# Patient Record
Sex: FEMALE | Race: BLACK OR AFRICAN AMERICAN
[De-identification: names, ages, dates, MRNs, and addresses within clinical notes are randomized per-mention and may not be internally consistent; named-entity substitution may affect disease eponyms.]

---

## 2020-03-19 ENCOUNTER — HOSPITAL ENCOUNTER (OUTPATIENT)
Dept: HOSPITAL 62 - RAD | Age: 55
End: 2020-03-19
Payer: COMMERCIAL

## 2020-03-19 DIAGNOSIS — M25.552: ICD-10-CM

## 2020-03-19 DIAGNOSIS — M54.5: ICD-10-CM

## 2020-03-19 DIAGNOSIS — M53.3: Primary | ICD-10-CM

## 2020-03-19 PROCEDURE — 73522 X-RAY EXAM HIPS BI 3-4 VIEWS: CPT

## 2020-03-19 PROCEDURE — 72110 X-RAY EXAM L-2 SPINE 4/>VWS: CPT

## 2020-03-19 NOTE — RADIOLOGY REPORT (SQ)
EXAM DESCRIPTION:  HIP BILATERAL



COMPLETED DATE/TIME:  3/19/2020 9:16 am



REASON FOR STUDY:  (M25.552)PAIN IN LEFT HIP;(M53.3)SACROCOCCYGEAL DISORDERS, NOT ELSEWHERE CL M25.55
2  PAIN IN LEFT HIP M53.3  SACROCOCCYGEAL DISORDERS, NOT ELSEWHERE CLASSIFIED M54.5  LOW BACK PAIN



COMPARISON:  None.



NUMBER OF VIEWS:  Two views



TECHNIQUE:  AP pelvis and additional frog-leg view of both hips.



LIMITATIONS:  None.



FINDINGS:  MINERALIZATION: Normal.

HIPS: No acute fracture or dislocation. No worrisome bone lesions.

PELVIS AND SACRUM:  No acute fracture or dislocation. No worrisome bone lesions.

PUBIS AND ISCHIUM: No acute fracture.

LOWER LUMBAR SPINE: No significant findings as visualized.

SOFT TISSUES: No findings.

OTHER: No other significant finding.



IMPRESSION:  NEGATIVE STUDY OF THE PELVIS AND HIPS.



TECHNICAL DOCUMENTATION:  JOB ID:  [id number]

 2011 [employer]- All Rights Reserved



Reading location - IP/workstation name: [email or web address]

## 2020-03-19 NOTE — RADIOLOGY REPORT (SQ)
EXAM DESCRIPTION:  L SPINE WHOLE



COMPLETED DATE/TIME:  3/19/2020 9:15 am



REASON FOR STUDY:  (M25.552)PAIN IN LEFT HIP;(M53.3)SACROCOCCYGEAL DISORDERS, NOT ELSEWHERE CL M25.55
2  PAIN IN LEFT HIP M53.3  SACROCOCCYGEAL DISORDERS, NOT ELSEWHERE CLASSIFIED M54.5  LOW BACK PAIN



COMPARISON:  None.



NUMBER OF VIEWS:  Five views including obliques.



TECHNIQUE:  AP, lateral, oblique, and sacral radiographic images acquired of the lumbar spine.



LIMITATIONS:  None.



FINDINGS:  MINERALIZATION: Normal.

SEGMENTATION: Normal.  No transitional anatomy.

ALIGNMENT: Normal.

VERTEBRAE: Maintained height.  No fracture or worrisome bone lesion.

DISCS: Preserved height.  No significant osteophytes or end plate irregularity.

POSTERIOR ELEMENTS: Pedicles and facets are intact.  No pars defect or posterior arch defects.

HARDWARE: None in the spine.

PARASPINAL SOFT TISSUES: Normal.

PELVIS: Intact as visualized. No fractures or worrisome bone lesions. SI joints intact.

OTHER: No other significant finding.



IMPRESSION:  NORMAL 5 VIEW LUMBAR SPINE.



TECHNICAL DOCUMENTATION:  JOB ID:  [id number]

 2011 [employer]- All Rights Reserved



Reading location - IP/workstation name: [email or web address]